# Patient Record
Sex: MALE | Race: WHITE | NOT HISPANIC OR LATINO | URBAN - NONMETROPOLITAN AREA
[De-identification: names, ages, dates, MRNs, and addresses within clinical notes are randomized per-mention and may not be internally consistent; named-entity substitution may affect disease eponyms.]

---
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
## 2022-05-31 NOTE — PROCEDURE: ORDER TESTS
Patient schedule for soonest AM physical 8/15/22. Please fill medications short term.   Expected Date Of Service: 07/07/2020